# Patient Record
Sex: FEMALE | Race: WHITE
[De-identification: names, ages, dates, MRNs, and addresses within clinical notes are randomized per-mention and may not be internally consistent; named-entity substitution may affect disease eponyms.]

---

## 2018-07-25 ENCOUNTER — HOSPITAL ENCOUNTER (EMERGENCY)
Dept: HOSPITAL 94 - ER | Age: 78
LOS: 1 days | Discharge: HOME | End: 2018-07-26
Payer: MEDICARE

## 2018-07-25 VITALS — WEIGHT: 154.32 LBS | HEIGHT: 59 IN | BODY MASS INDEX: 31.11 KG/M2

## 2018-07-25 DIAGNOSIS — Z88.1: ICD-10-CM

## 2018-07-25 DIAGNOSIS — Z88.6: ICD-10-CM

## 2018-07-25 DIAGNOSIS — R07.89: Primary | ICD-10-CM

## 2018-07-25 DIAGNOSIS — I10: ICD-10-CM

## 2018-07-25 DIAGNOSIS — I25.10: ICD-10-CM

## 2018-07-25 DIAGNOSIS — M81.0: ICD-10-CM

## 2018-07-25 DIAGNOSIS — I25.2: ICD-10-CM

## 2018-07-25 DIAGNOSIS — J18.9: ICD-10-CM

## 2018-07-25 DIAGNOSIS — E11.9: ICD-10-CM

## 2018-07-25 DIAGNOSIS — Z88.5: ICD-10-CM

## 2018-07-25 DIAGNOSIS — Z88.0: ICD-10-CM

## 2018-07-25 DIAGNOSIS — Z79.899: ICD-10-CM

## 2018-07-25 DIAGNOSIS — Z79.4: ICD-10-CM

## 2018-07-25 LAB
ALBUMIN SERPL BCP-MCNC: 3 G/DL (ref 3.4–5)
ALBUMIN/GLOB SERPL: 0.6 {RATIO} (ref 1.1–1.5)
ALP SERPL-CCNC: 108 IU/L (ref 46–116)
ALT SERPL W P-5'-P-CCNC: 23 U/L (ref 12–78)
ANION GAP SERPL CALCULATED.3IONS-SCNC: 7 MMOL/L (ref 8–16)
APTT PPP: 26 SECONDS (ref 22–32)
AST SERPL W P-5'-P-CCNC: 20 U/L (ref 10–37)
BASOPHILS # BLD AUTO: 0 X10'3 (ref 0–0.2)
BASOPHILS NFR BLD AUTO: 0.2 % (ref 0–1)
BILIRUB SERPL-MCNC: 0.4 MG/DL (ref 0.1–1)
BUN SERPL-MCNC: 23 MG/DL (ref 7–18)
BUN/CREAT SERPL: 20.2 (ref 6.6–38)
CALCIUM SERPL-MCNC: 8.7 MG/DL (ref 8.5–10.1)
CHLORIDE SERPL-SCNC: 101 MMOL/L (ref 99–107)
CLARITY UR: CLEAR
CO2 SERPL-SCNC: 28.1 MMOL/L (ref 24–32)
COLOR UR: YELLOW
CREAT SERPL-MCNC: 1.14 MG/DL (ref 0.4–0.9)
EOSINOPHIL # BLD AUTO: 0.4 X10'3 (ref 0–0.9)
EOSINOPHIL NFR BLD AUTO: 2.1 % (ref 0–6)
ERYTHROCYTE [DISTWIDTH] IN BLOOD BY AUTOMATED COUNT: 14.3 % (ref 11.5–14.5)
GFR SERPL CREATININE-BSD FRML MDRD: 46 ML/MIN
GLUCOSE SERPL-MCNC: 273 MG/DL (ref 70–104)
GLUCOSE UR STRIP-MCNC: 250 MG/DL
HCT VFR BLD AUTO: 29 % (ref 35–45)
HGB BLD-MCNC: 9.5 G/DL (ref 12–16)
HGB UR QL STRIP: NEGATIVE
INR PPP: 1 INR
KETONES UR STRIP-MCNC: NEGATIVE MG/DL
LEUKOCYTE ESTERASE UR QL STRIP: NEGATIVE
LYMPHOCYTES # BLD AUTO: 1.6 X10'3 (ref 1.1–4.8)
LYMPHOCYTES NFR BLD AUTO: 9.1 % (ref 21–51)
MCH RBC QN AUTO: 28.2 PG (ref 27–31)
MCHC RBC AUTO-ENTMCNC: 32.9 % (ref 33–36.5)
MCV RBC AUTO: 85.9 FL (ref 78–98)
MONOCYTES # BLD AUTO: 1.2 X10'3 (ref 0–0.9)
MONOCYTES NFR BLD AUTO: 6.6 % (ref 2–12)
NEUTROPHILS # BLD AUTO: 14.7 X10'3 (ref 1.8–7.7)
NEUTROPHILS NFR BLD AUTO: 82 % (ref 42–75)
NITRITE UR QL STRIP: NEGATIVE
PH UR STRIP: 7 [PH] (ref 4.8–8)
PLATELET # BLD AUTO: 345 X10'3 (ref 140–440)
PMV BLD AUTO: 7.8 FL (ref 7.4–10.4)
POTASSIUM SERPL-SCNC: 4.3 MMOL/L (ref 3.5–5.1)
PROT SERPL-MCNC: 7.7 G/DL (ref 6.4–8.2)
PROT UR QL STRIP: NEGATIVE MG/DL
PROTHROMBIN TIME: 10.8 SECONDS (ref 9–12)
RBC # BLD AUTO: 3.37 X10'6 (ref 4.2–5.6)
SODIUM SERPL-SCNC: 136 MMOL/L (ref 135–145)
SP GR UR STRIP: 1.01 (ref 1–1.03)
URN COLLECT METHOD CLASS: (no result)
UROBILINOGEN UR STRIP-MCNC: 0.2 E.U/DL (ref 0.2–1)
WBC # BLD AUTO: 18 X10'3 (ref 4.5–11)

## 2018-07-25 PROCEDURE — 96374 THER/PROPH/DIAG INJ IV PUSH: CPT

## 2018-07-25 PROCEDURE — 85730 THROMBOPLASTIN TIME PARTIAL: CPT

## 2018-07-25 PROCEDURE — 93005 ELECTROCARDIOGRAM TRACING: CPT

## 2018-07-25 PROCEDURE — 84484 ASSAY OF TROPONIN QUANT: CPT

## 2018-07-25 PROCEDURE — 99285 EMERGENCY DEPT VISIT HI MDM: CPT

## 2018-07-25 PROCEDURE — 85025 COMPLETE CBC W/AUTO DIFF WBC: CPT

## 2018-07-25 PROCEDURE — 71045 X-RAY EXAM CHEST 1 VIEW: CPT

## 2018-07-25 PROCEDURE — 85610 PROTHROMBIN TIME: CPT

## 2018-07-25 PROCEDURE — 80053 COMPREHEN METABOLIC PANEL: CPT

## 2018-07-25 PROCEDURE — 81003 URINALYSIS AUTO W/O SCOPE: CPT

## 2018-07-25 PROCEDURE — 36415 COLL VENOUS BLD VENIPUNCTURE: CPT

## 2018-07-26 VITALS — SYSTOLIC BLOOD PRESSURE: 108 MMHG | DIASTOLIC BLOOD PRESSURE: 39 MMHG

## 2018-10-11 ENCOUNTER — HOSPITAL ENCOUNTER (OUTPATIENT)
Dept: HOSPITAL 94 - GI LAB | Age: 78
Discharge: HOME | End: 2018-10-11
Attending: INTERNAL MEDICINE
Payer: MEDICARE

## 2018-10-11 VITALS — DIASTOLIC BLOOD PRESSURE: 70 MMHG | SYSTOLIC BLOOD PRESSURE: 137 MMHG

## 2018-10-11 VITALS — HEIGHT: 59 IN | WEIGHT: 153.33 LBS | BODY MASS INDEX: 30.91 KG/M2

## 2018-10-11 VITALS — DIASTOLIC BLOOD PRESSURE: 63 MMHG | SYSTOLIC BLOOD PRESSURE: 136 MMHG

## 2018-10-11 VITALS — DIASTOLIC BLOOD PRESSURE: 72 MMHG | SYSTOLIC BLOOD PRESSURE: 173 MMHG

## 2018-10-11 VITALS — DIASTOLIC BLOOD PRESSURE: 68 MMHG | SYSTOLIC BLOOD PRESSURE: 144 MMHG

## 2018-10-11 VITALS — SYSTOLIC BLOOD PRESSURE: 142 MMHG | DIASTOLIC BLOOD PRESSURE: 64 MMHG

## 2018-10-11 DIAGNOSIS — Z96.611: ICD-10-CM

## 2018-10-11 DIAGNOSIS — Z96.651: ICD-10-CM

## 2018-10-11 DIAGNOSIS — M19.90: ICD-10-CM

## 2018-10-11 DIAGNOSIS — I50.9: ICD-10-CM

## 2018-10-11 DIAGNOSIS — Z96.612: ICD-10-CM

## 2018-10-11 DIAGNOSIS — Z98.890: ICD-10-CM

## 2018-10-11 DIAGNOSIS — Z87.01: ICD-10-CM

## 2018-10-11 DIAGNOSIS — E11.9: ICD-10-CM

## 2018-10-11 DIAGNOSIS — Z88.8: ICD-10-CM

## 2018-10-11 DIAGNOSIS — Z88.6: ICD-10-CM

## 2018-10-11 DIAGNOSIS — Z86.74: ICD-10-CM

## 2018-10-11 DIAGNOSIS — Z90.49: ICD-10-CM

## 2018-10-11 DIAGNOSIS — Z99.81: ICD-10-CM

## 2018-10-11 DIAGNOSIS — Z79.899: ICD-10-CM

## 2018-10-11 DIAGNOSIS — Z79.891: ICD-10-CM

## 2018-10-11 DIAGNOSIS — Z88.5: ICD-10-CM

## 2018-10-11 DIAGNOSIS — K29.50: ICD-10-CM

## 2018-10-11 DIAGNOSIS — Z87.11: ICD-10-CM

## 2018-10-11 DIAGNOSIS — M81.0: ICD-10-CM

## 2018-10-11 DIAGNOSIS — K22.2: Primary | ICD-10-CM

## 2018-10-11 DIAGNOSIS — Z90.710: ICD-10-CM

## 2018-10-11 DIAGNOSIS — I11.0: ICD-10-CM

## 2018-10-11 DIAGNOSIS — Z79.4: ICD-10-CM

## 2018-10-11 DIAGNOSIS — K21.9: ICD-10-CM

## 2018-10-11 DIAGNOSIS — K44.9: ICD-10-CM

## 2018-10-11 DIAGNOSIS — Z85.42: ICD-10-CM

## 2018-10-11 PROCEDURE — 99152 MOD SED SAME PHYS/QHP 5/>YRS: CPT

## 2018-10-11 PROCEDURE — 43450 DILATE ESOPHAGUS 1/MULT PASS: CPT

## 2018-10-11 PROCEDURE — 43239 EGD BIOPSY SINGLE/MULTIPLE: CPT

## 2022-09-29 LAB
ALBUMIN SERPL BCP-MCNC: 3.6 G/DL (ref 3.4–5)
ANION GAP SERPL CALCULATED.3IONS-SCNC: 9 MMOL/L (ref 8–16)
APTT PPP: 27 SECONDS (ref 22–32)
BASOPHILS # BLD AUTO: 0.1 X10'3 (ref 0–0.2)
BASOPHILS NFR BLD AUTO: 0.8 % (ref 0–1)
BUN SERPL-MCNC: 17 MG/DL (ref 7–18)
BUN/CREAT SERPL: 16.7 (ref 6.6–38)
CALCIUM SERPL-MCNC: 9.5 MG/DL (ref 8.5–10.1)
CHLORIDE SERPL-SCNC: 105 MMOL/L (ref 99–107)
CHOLEST SERPL-MCNC: 174 MG/DL (ref 0–200)
CHOLEST/HDLC SERPL: 1.9 {RATIO} (ref 0–4.99)
CO2 SERPL-SCNC: 28.8 MMOL/L (ref 24–32)
CREAT SERPL-MCNC: 1.02 MG/DL (ref 0.4–0.9)
EOSINOPHIL # BLD AUTO: 0.3 X10'3 (ref 0–0.9)
EOSINOPHIL NFR BLD AUTO: 3.8 % (ref 0–6)
ERYTHROCYTE [DISTWIDTH] IN BLOOD BY AUTOMATED COUNT: 13.7 % (ref 11.5–14.5)
GFR SERPL CREATININE-BSD FRML MDRD: 52 ML/MIN
GLUCOSE SERPL-MCNC: 77 MG/DL (ref 70–104)
HCT VFR BLD AUTO: 37.3 % (ref 35–45)
HDLC SERPL-MCNC: 90 MG/DL (ref 35–60)
HGB BLD-MCNC: 12.6 G/DL (ref 12–16)
LDLC SERPL DIRECT ASSAY-MCNC: 63 MG/DL (ref 50–100)
LYMPHOCYTES # BLD AUTO: 2.6 X10'3 (ref 1.1–4.8)
LYMPHOCYTES NFR BLD AUTO: 35.3 % (ref 21–51)
MCH RBC QN AUTO: 31.6 PG (ref 27–31)
MCHC RBC AUTO-ENTMCNC: 33.8 G/DL (ref 33–36.5)
MCV RBC AUTO: 93.6 FL (ref 78–98)
MONOCYTES # BLD AUTO: 0.6 X10'3 (ref 0–0.9)
MONOCYTES NFR BLD AUTO: 7.8 % (ref 2–12)
NEUTROPHILS # BLD AUTO: 3.8 X10'3 (ref 1.8–7.7)
NEUTROPHILS NFR BLD AUTO: 52.3 % (ref 42–75)
PLATELET # BLD AUTO: 265 X10'3 (ref 140–440)
PMV BLD AUTO: 8 FL (ref 7.4–10.4)
POTASSIUM SERPL-SCNC: 4.1 MMOL/L (ref 3.5–5.1)
RBC # BLD AUTO: 3.98 X10'6 (ref 4.2–5.6)
SODIUM SERPL-SCNC: 143 MMOL/L (ref 135–145)
TRIGL SERPL-MCNC: 59 MG/DL (ref 20–135)
WBC # BLD AUTO: 7.3 X10'3 (ref 4.5–11)

## 2022-10-04 ENCOUNTER — HOSPITAL ENCOUNTER (OUTPATIENT)
Dept: HOSPITAL 94 - SSTAY O | Age: 82
Discharge: HOME | End: 2022-10-04
Attending: STUDENT IN AN ORGANIZED HEALTH CARE EDUCATION/TRAINING PROGRAM
Payer: MEDICARE

## 2022-10-04 VITALS — DIASTOLIC BLOOD PRESSURE: 74 MMHG | SYSTOLIC BLOOD PRESSURE: 155 MMHG

## 2022-10-04 VITALS — BODY MASS INDEX: 28.71 KG/M2 | HEIGHT: 59 IN | WEIGHT: 142.42 LBS

## 2022-10-04 VITALS — SYSTOLIC BLOOD PRESSURE: 157 MMHG | DIASTOLIC BLOOD PRESSURE: 73 MMHG

## 2022-10-04 VITALS — SYSTOLIC BLOOD PRESSURE: 144 MMHG | DIASTOLIC BLOOD PRESSURE: 63 MMHG

## 2022-10-04 VITALS — SYSTOLIC BLOOD PRESSURE: 173 MMHG | DIASTOLIC BLOOD PRESSURE: 68 MMHG

## 2022-10-04 VITALS — DIASTOLIC BLOOD PRESSURE: 59 MMHG | SYSTOLIC BLOOD PRESSURE: 133 MMHG

## 2022-10-04 VITALS — DIASTOLIC BLOOD PRESSURE: 69 MMHG | SYSTOLIC BLOOD PRESSURE: 160 MMHG

## 2022-10-04 VITALS — DIASTOLIC BLOOD PRESSURE: 66 MMHG | SYSTOLIC BLOOD PRESSURE: 148 MMHG

## 2022-10-04 DIAGNOSIS — I10: ICD-10-CM

## 2022-10-04 DIAGNOSIS — I27.20: ICD-10-CM

## 2022-10-04 DIAGNOSIS — G47.33: ICD-10-CM

## 2022-10-04 DIAGNOSIS — I25.82: ICD-10-CM

## 2022-10-04 DIAGNOSIS — I25.10: ICD-10-CM

## 2022-10-04 DIAGNOSIS — Z88.8: ICD-10-CM

## 2022-10-04 DIAGNOSIS — E78.5: ICD-10-CM

## 2022-10-04 DIAGNOSIS — R94.39: Primary | ICD-10-CM

## 2022-10-04 DIAGNOSIS — Z88.6: ICD-10-CM

## 2022-10-04 DIAGNOSIS — M10.9: ICD-10-CM

## 2022-10-04 DIAGNOSIS — Z98.890: ICD-10-CM

## 2022-10-04 DIAGNOSIS — Z79.01: ICD-10-CM

## 2022-10-04 DIAGNOSIS — Z79.899: ICD-10-CM

## 2022-10-04 PROCEDURE — 80048 BASIC METABOLIC PNL TOTAL CA: CPT

## 2022-10-04 PROCEDURE — 36415 COLL VENOUS BLD VENIPUNCTURE: CPT

## 2022-10-04 PROCEDURE — 99152 MOD SED SAME PHYS/QHP 5/>YRS: CPT

## 2022-10-04 PROCEDURE — 80061 LIPID PANEL: CPT

## 2022-10-04 PROCEDURE — 85025 COMPLETE CBC W/AUTO DIFF WBC: CPT

## 2022-10-04 PROCEDURE — 93005 ELECTROCARDIOGRAM TRACING: CPT

## 2022-10-04 PROCEDURE — 93458 L HRT ARTERY/VENTRICLE ANGIO: CPT

## 2022-10-04 PROCEDURE — 85610 PROTHROMBIN TIME: CPT

## 2022-10-04 PROCEDURE — 82948 REAGENT STRIP/BLOOD GLUCOSE: CPT

## 2022-10-04 PROCEDURE — 99153 MOD SED SAME PHYS/QHP EA: CPT

## 2022-10-04 PROCEDURE — 85730 THROMBOPLASTIN TIME PARTIAL: CPT

## 2022-10-04 NOTE — NUR
1425 pt returned to unit. Pt denies pain, denies sob. Pt had 100ml oral fluid intake. Site 
stable, DRSG, CD&I, no hematoma.

## 2023-01-29 ENCOUNTER — HOSPITAL ENCOUNTER (EMERGENCY)
Dept: HOSPITAL 94 - ER | Age: 83
Discharge: HOME | End: 2023-01-29
Payer: MEDICARE

## 2023-01-29 VITALS — HEIGHT: 59 IN | BODY MASS INDEX: 28.89 KG/M2 | WEIGHT: 143.3 LBS

## 2023-01-29 VITALS — DIASTOLIC BLOOD PRESSURE: 59 MMHG | SYSTOLIC BLOOD PRESSURE: 126 MMHG

## 2023-01-29 DIAGNOSIS — G89.29: ICD-10-CM

## 2023-01-29 DIAGNOSIS — M25.552: ICD-10-CM

## 2023-01-29 DIAGNOSIS — Z88.8: ICD-10-CM

## 2023-01-29 DIAGNOSIS — E11.9: ICD-10-CM

## 2023-01-29 DIAGNOSIS — I25.10: ICD-10-CM

## 2023-01-29 DIAGNOSIS — Z88.0: ICD-10-CM

## 2023-01-29 DIAGNOSIS — I10: ICD-10-CM

## 2023-01-29 DIAGNOSIS — Z79.899: ICD-10-CM

## 2023-01-29 DIAGNOSIS — I25.2: ICD-10-CM

## 2023-01-29 DIAGNOSIS — Z88.5: ICD-10-CM

## 2023-01-29 DIAGNOSIS — M54.42: Primary | ICD-10-CM

## 2023-01-29 DIAGNOSIS — Z79.4: ICD-10-CM

## 2023-01-29 PROCEDURE — 72148 MRI LUMBAR SPINE W/O DYE: CPT

## 2023-01-29 PROCEDURE — 96372 THER/PROPH/DIAG INJ SC/IM: CPT

## 2023-01-29 PROCEDURE — 99285 EMERGENCY DEPT VISIT HI MDM: CPT
